# Patient Record
Sex: MALE | Race: BLACK OR AFRICAN AMERICAN
[De-identification: names, ages, dates, MRNs, and addresses within clinical notes are randomized per-mention and may not be internally consistent; named-entity substitution may affect disease eponyms.]

---

## 2019-02-11 ENCOUNTER — HOSPITAL ENCOUNTER (INPATIENT)
Dept: HOSPITAL 72 - EMR | Age: 64
LOS: 4 days | Discharge: NURSING, REHAB OR LONG-TERM CARE | DRG: 364 | End: 2019-02-15
Payer: MEDICARE

## 2019-02-11 VITALS — SYSTOLIC BLOOD PRESSURE: 98 MMHG | DIASTOLIC BLOOD PRESSURE: 71 MMHG

## 2019-02-11 VITALS — DIASTOLIC BLOOD PRESSURE: 70 MMHG | SYSTOLIC BLOOD PRESSURE: 127 MMHG

## 2019-02-11 VITALS — SYSTOLIC BLOOD PRESSURE: 95 MMHG | DIASTOLIC BLOOD PRESSURE: 63 MMHG

## 2019-02-11 VITALS — WEIGHT: 117.5 LBS | HEIGHT: 72 IN | BODY MASS INDEX: 15.92 KG/M2

## 2019-02-11 DIAGNOSIS — I10: ICD-10-CM

## 2019-02-11 DIAGNOSIS — E83.42: ICD-10-CM

## 2019-02-11 DIAGNOSIS — L02.416: Primary | ICD-10-CM

## 2019-02-11 DIAGNOSIS — J44.9: ICD-10-CM

## 2019-02-11 DIAGNOSIS — E87.6: ICD-10-CM

## 2019-02-11 DIAGNOSIS — Z88.8: ICD-10-CM

## 2019-02-11 DIAGNOSIS — Z86.718: ICD-10-CM

## 2019-02-11 DIAGNOSIS — L03.116: ICD-10-CM

## 2019-02-11 DIAGNOSIS — Z86.73: ICD-10-CM

## 2019-02-11 DIAGNOSIS — F17.200: ICD-10-CM

## 2019-02-11 DIAGNOSIS — G89.4: ICD-10-CM

## 2019-02-11 LAB
ADD MANUAL DIFF: NO
ALBUMIN SERPL-MCNC: 3.5 G/DL (ref 3.4–5)
ALBUMIN/GLOB SERPL: 0.7 {RATIO} (ref 1–2.7)
ALP SERPL-CCNC: 219 U/L (ref 46–116)
ALT SERPL-CCNC: 13 U/L (ref 12–78)
ANION GAP SERPL CALC-SCNC: 8 MMOL/L (ref 5–15)
APPEARANCE UR: CLEAR
APTT PPP: YELLOW S
AST SERPL-CCNC: 24 U/L (ref 15–37)
BASOPHILS NFR BLD AUTO: 1.1 % (ref 0–2)
BILIRUB SERPL-MCNC: 0.3 MG/DL (ref 0.2–1)
BUN SERPL-MCNC: 7 MG/DL (ref 7–18)
CALCIUM SERPL-MCNC: 9.5 MG/DL (ref 8.5–10.1)
CHLORIDE SERPL-SCNC: 100 MMOL/L (ref 98–107)
CO2 SERPL-SCNC: 31 MMOL/L (ref 21–32)
CREAT SERPL-MCNC: 0.7 MG/DL (ref 0.55–1.3)
EOSINOPHIL NFR BLD AUTO: 1.5 % (ref 0–3)
ERYTHROCYTE [DISTWIDTH] IN BLOOD BY AUTOMATED COUNT: 12.7 % (ref 11.6–14.8)
GLOBULIN SER-MCNC: 5.3 G/DL
GLUCOSE UR STRIP-MCNC: NEGATIVE MG/DL
HCT VFR BLD CALC: 39.2 % (ref 42–52)
HGB BLD-MCNC: 13.1 G/DL (ref 14.2–18)
KETONES UR QL STRIP: NEGATIVE
LEUKOCYTE ESTERASE UR QL STRIP: (no result)
LYMPHOCYTES NFR BLD AUTO: 20.6 % (ref 20–45)
MCV RBC AUTO: 94 FL (ref 80–99)
MONOCYTES NFR BLD AUTO: 7.9 % (ref 1–10)
NEUTROPHILS NFR BLD AUTO: 68.9 % (ref 45–75)
NITRITE UR QL STRIP: NEGATIVE
PH UR STRIP: 6.5 [PH] (ref 4.5–8)
PLATELET # BLD: 413 K/UL (ref 150–450)
POTASSIUM SERPL-SCNC: 4.2 MMOL/L (ref 3.5–5.1)
PROT UR QL STRIP: (no result)
RBC # BLD AUTO: 4.18 M/UL (ref 4.7–6.1)
SODIUM SERPL-SCNC: 139 MMOL/L (ref 136–145)
SP GR UR STRIP: 1.01 (ref 1–1.03)
UROBILINOGEN UR-MCNC: 1 MG/DL (ref 0–1)
WBC # BLD AUTO: 8.5 K/UL (ref 4.8–10.8)

## 2019-02-11 PROCEDURE — 87070 CULTURE OTHR SPECIMN AEROBIC: CPT

## 2019-02-11 PROCEDURE — 83735 ASSAY OF MAGNESIUM: CPT

## 2019-02-11 PROCEDURE — 82607 VITAMIN B-12: CPT

## 2019-02-11 PROCEDURE — 80048 BASIC METABOLIC PNL TOTAL CA: CPT

## 2019-02-11 PROCEDURE — 83605 ASSAY OF LACTIC ACID: CPT

## 2019-02-11 PROCEDURE — 87205 SMEAR GRAM STAIN: CPT

## 2019-02-11 PROCEDURE — 96365 THER/PROPH/DIAG IV INF INIT: CPT

## 2019-02-11 PROCEDURE — 84550 ASSAY OF BLOOD/URIC ACID: CPT

## 2019-02-11 PROCEDURE — 36415 COLL VENOUS BLD VENIPUNCTURE: CPT

## 2019-02-11 PROCEDURE — 96367 TX/PROPH/DG ADDL SEQ IV INF: CPT

## 2019-02-11 PROCEDURE — 71045 X-RAY EXAM CHEST 1 VIEW: CPT

## 2019-02-11 PROCEDURE — 81003 URINALYSIS AUTO W/O SCOPE: CPT

## 2019-02-11 PROCEDURE — 99285 EMERGENCY DEPT VISIT HI MDM: CPT

## 2019-02-11 PROCEDURE — 86140 C-REACTIVE PROTEIN: CPT

## 2019-02-11 PROCEDURE — 80061 LIPID PANEL: CPT

## 2019-02-11 PROCEDURE — 73502 X-RAY EXAM HIP UNI 2-3 VIEWS: CPT

## 2019-02-11 PROCEDURE — 80053 COMPREHEN METABOLIC PANEL: CPT

## 2019-02-11 PROCEDURE — 85651 RBC SED RATE NONAUTOMATED: CPT

## 2019-02-11 PROCEDURE — 83036 HEMOGLOBIN GLYCOSYLATED A1C: CPT

## 2019-02-11 PROCEDURE — 83880 ASSAY OF NATRIURETIC PEPTIDE: CPT

## 2019-02-11 PROCEDURE — 82977 ASSAY OF GGT: CPT

## 2019-02-11 PROCEDURE — 85025 COMPLETE CBC W/AUTO DIFF WBC: CPT

## 2019-02-11 PROCEDURE — 82746 ASSAY OF FOLIC ACID SERUM: CPT

## 2019-02-11 PROCEDURE — 84443 ASSAY THYROID STIM HORMONE: CPT

## 2019-02-11 PROCEDURE — 0J9M0ZZ DRAINAGE OF LEFT UPPER LEG SUBCUTANEOUS TISSUE AND FASCIA, OPEN APPROACH: ICD-10-PCS

## 2019-02-11 PROCEDURE — 84100 ASSAY OF PHOSPHORUS: CPT

## 2019-02-11 PROCEDURE — 96361 HYDRATE IV INFUSION ADD-ON: CPT

## 2019-02-11 PROCEDURE — 80202 ASSAY OF VANCOMYCIN: CPT

## 2019-02-11 PROCEDURE — 87040 BLOOD CULTURE FOR BACTERIA: CPT

## 2019-02-11 RX ADMIN — ENOXAPARIN SODIUM SCH MG: 30 INJECTION SUBCUTANEOUS at 22:32

## 2019-02-11 RX ADMIN — HYDROCODONE BITARTRATE AND ACETAMINOPHEN PRN TAB: 5; 325 TABLET ORAL at 22:47

## 2019-02-11 NOTE — NUR
ED Nurse Note:



Pt refused to be checked the temp. pt aao x4 and in stable condition with 
stable VS. pt was being transferred to MS unit by 1 ER tech.
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
## 2019-02-11 NOTE — DIAGNOSTIC IMAGING REPORT
Indication: Left hip pain

 

Technique: 2 views of the left hip

 

Comparison: none

 

Findings: There is superior degenerative joint space narrowing, mild. There is

extensive surgical hardware in the right hip. No definite acute fractures. No

dislocations.

 

Impression: Degenerative changes as described

 

No acute bony trauma
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
## 2019-02-11 NOTE — NUR
CASE MANAGEMENT: REVIEW



63/M BIBA FROM [facility] 



CC: SKIN RASH . ABSCESS





SI: LEFT HIP CELLULITIS

T 97.9  RR 20 BP 98/71 SAT 96% ROOM AIR

H/H 13.1/39.2 ALK PHOS 219 







IS: NS IVF BOLUS X1

UNASYN IV X1





***PATIENT ADMITTED TO MED/SURG UNIT 02/11/2019***

DCP: PATIENT IS FROM [facility]
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
## 2019-02-11 NOTE — DIAGNOSTIC IMAGING REPORT
Indication: Cough

 

Technique: One view of the chest

 

Comparison: none

 

Findings: The lungs are hyperinflated. There are multiple healed right rib fractures.

There is also an ununited but probably old right posterolateral rib fracture. The

lungs and pleural spaces are clear. The heart size is normal. Surgical clips are seen

in the left supraclavicular fossa. There is fairly extensive pleural thickening at

the left lung apex

 

Impression: No definite acute process

 

COPD changes

 

Evidence of prior right rib trauma

 

Left apical pleural scarring

 

Surgical clips in the left supraclavicular fossa

## 2019-02-12 VITALS — SYSTOLIC BLOOD PRESSURE: 112 MMHG | DIASTOLIC BLOOD PRESSURE: 79 MMHG

## 2019-02-12 VITALS — SYSTOLIC BLOOD PRESSURE: 119 MMHG | DIASTOLIC BLOOD PRESSURE: 75 MMHG

## 2019-02-12 VITALS — SYSTOLIC BLOOD PRESSURE: 112 MMHG | DIASTOLIC BLOOD PRESSURE: 82 MMHG

## 2019-02-12 VITALS — SYSTOLIC BLOOD PRESSURE: 122 MMHG | DIASTOLIC BLOOD PRESSURE: 81 MMHG

## 2019-02-12 VITALS — SYSTOLIC BLOOD PRESSURE: 104 MMHG | DIASTOLIC BLOOD PRESSURE: 69 MMHG

## 2019-02-12 LAB
ADD MANUAL DIFF: NO
ANION GAP SERPL CALC-SCNC: 12 MMOL/L (ref 5–15)
BASOPHILS NFR BLD AUTO: 1.2 % (ref 0–2)
BUN SERPL-MCNC: 5 MG/DL (ref 7–18)
CALCIUM SERPL-MCNC: 10.5 MG/DL (ref 8.5–10.1)
CHLORIDE SERPL-SCNC: 98 MMOL/L (ref 98–107)
CO2 SERPL-SCNC: 26 MMOL/L (ref 21–32)
CREAT SERPL-MCNC: 0.5 MG/DL (ref 0.55–1.3)
EOSINOPHIL NFR BLD AUTO: 2.1 % (ref 0–3)
ERYTHROCYTE [DISTWIDTH] IN BLOOD BY AUTOMATED COUNT: 13 % (ref 11.6–14.8)
HCT VFR BLD CALC: 45 % (ref 42–52)
HGB BLD-MCNC: 15.1 G/DL (ref 14.2–18)
LYMPHOCYTES NFR BLD AUTO: 24.9 % (ref 20–45)
MCV RBC AUTO: 93 FL (ref 80–99)
MONOCYTES NFR BLD AUTO: 5.1 % (ref 1–10)
NEUTROPHILS NFR BLD AUTO: 66.7 % (ref 45–75)
PLATELET # BLD: 357 K/UL (ref 150–450)
POTASSIUM SERPL-SCNC: 3.7 MMOL/L (ref 3.5–5.1)
RBC # BLD AUTO: 4.85 M/UL (ref 4.7–6.1)
SODIUM SERPL-SCNC: 136 MMOL/L (ref 136–145)
WBC # BLD AUTO: 5.9 K/UL (ref 4.8–10.8)

## 2019-02-12 RX ADMIN — DOCUSATE SODIUM SCH MG: 50 LIQUID ORAL at 09:00

## 2019-02-12 RX ADMIN — MAGNESIUM HYDROXIDE SCH ML: 400 SUSPENSION ORAL at 08:31

## 2019-02-12 RX ADMIN — ENOXAPARIN SODIUM SCH MG: 30 INJECTION SUBCUTANEOUS at 08:33

## 2019-02-12 RX ADMIN — DOCUSATE SODIUM SCH MG: 50 LIQUID ORAL at 08:31

## 2019-02-12 RX ADMIN — SODIUM CHLORIDE SCH MLS/HR: 9 INJECTION, SOLUTION INTRAVENOUS at 22:21

## 2019-02-12 RX ADMIN — ENOXAPARIN SODIUM SCH MG: 30 INJECTION SUBCUTANEOUS at 21:00

## 2019-02-12 RX ADMIN — SODIUM CHLORIDE SCH MLS/HR: 9 INJECTION, SOLUTION INTRAVENOUS at 08:32

## 2019-02-12 RX ADMIN — HYDROCODONE BITARTRATE AND ACETAMINOPHEN PRN TAB: 5; 325 TABLET ORAL at 10:17

## 2019-02-12 RX ADMIN — DOCUSATE SODIUM SCH MG: 50 LIQUID ORAL at 17:28

## 2019-02-12 RX ADMIN — Medication SCH MG: at 08:31

## 2019-02-12 RX ADMIN — HYDROCODONE BITARTRATE AND ACETAMINOPHEN PRN TAB: 5; 325 TABLET ORAL at 22:22

## 2019-02-12 NOTE — NUR
NURSE NOTES:

RT Garcia requested me to get an order for Albutrol or Duonep PRN to give it to patient 
before Mucomyst. I communicated Dr Leroy and received an order.

## 2019-02-12 NOTE — NUR
NURSE NOTES:WOUND CARE NOTES:Pt presents with resolving pressure injury to sacrum. Base of 
wound moist with pink granulation .Epithelialized borders .Non-tender when palpated.No odor 
or exudate noted.(L)0.9cm x (W)1cm. 1.1cm slit noted to L hip with small amt sanguineous 
exudate.Pt verbalized he may have scratched himself.Periwound without erythema or elevation 
in skin temp. Bilat heels are intact and blanchable .No other skin concerns noted .Pt 
educated on wound prevention .Encouraged to turn frequently or at least hourly to prevent 
further skin breakdown.



Recommendations:Apply Moisture Barrier to sacral wound .Cover with Optifoam drsg Daily and 
prn.

                         Cleanse L hip with Saline.Apply Silvasorb gel.Cover with Optifoam 
drsg every 3 days and prn.

                         Encourage and assist as needed with repositioning at least every 
2hours or as tolerated.

## 2019-02-12 NOTE — SURGERY PROGRESS NOTE
Surgery Progress Note


Subjective


Additional Comments


no acute events.  feeling better. no complaints.  wound dressings changed.





Objective





Last 24 Hour Vital Signs








  Date Time  Temp Pulse Resp B/P (MAP) Pulse Ox O2 Delivery O2 Flow Rate FiO2


 


2/12/19 12:00 98.2 81 18 112/79 (90) 95   


 


2/12/19 10:47 98.4       


 


2/12/19 09:00      Room Air  


 


2/12/19 08:00 98.4 94 18 104/69 (81) 100   


 


2/12/19 07:26      Room Air  


 


2/12/19 07:26      Room Air  


 


2/12/19 04:00 98.2 76 18 122/81 (95) 98   


 


2/12/19 00:00 98.0 88 18 112/79 (90) 95   


 


2/11/19 21:23      Room Air  


 


2/11/19 21:16      Room Air  


 


2/11/19 20:00 98.7 91 18 95/63 (74) 92   


 


2/11/19 18:10  99 21 129/70 98 Room Air  


 


2/11/19 18:10  99 20 127/70 99 Room Air  


 


2/11/19 15:30 97.9 92 20 98/71 100 Room Air  


 


2/11/19 15:00 97.9 111 20 119/74 96 Room Air  








I&O











Intake and Output  


 


 2/11/19 2/12/19





 18:59 06:59


 


Intake Total 1110 ml 275.000 ml


 


Output Total  100 ml


 


Balance 1110 ml 175.000 ml


 


  


 


Intake Oral 0 ml 


 


IV Total 1110 ml 275.000 ml


 


Output Urine Total  100 ml








Dressing:  saturated


Wound:  clean


Drains:  none


Cardiovascular:  RSR


Respiratory:  clear


Abdomen:  soft, flat, non-tender, present bowel sounds, non-distended


Extremities:  no cyanosis, other





Laboratory Tests








Test


  2/11/19


16:45 2/11/19


18:00 2/12/19


06:50


 


White Blood Count


  8.5 K/UL


(4.8-10.8) 


  5.9 K/UL


(4.8-10.8)


 


Red Blood Count


  4.18 M/UL


(4.70-6.10)  L 


  4.85 M/UL


(4.70-6.10)


 


Hemoglobin


  13.1 G/DL


(14.2-18.0)  L 


  15.1 G/DL


(14.2-18.0)


 


Hematocrit


  39.2 %


(42.0-52.0)  L 


  45.0 %


(42.0-52.0)


 


Mean Corpuscular Volume 94 FL (80-99)    93 FL (80-99)  


 


Mean Corpuscular Hemoglobin


  31.3 PG


(27.0-31.0)  H 


  31.0 PG


(27.0-31.0)


 


Mean Corpuscular Hemoglobin


Concent 33.4 G/DL


(32.0-36.0) 


  33.5 G/DL


(32.0-36.0)


 


Red Cell Distribution Width


  12.7 %


(11.6-14.8) 


  13.0 %


(11.6-14.8)


 


Platelet Count


  413 K/UL


(150-450) 


  357 K/UL


(150-450)


 


Mean Platelet Volume


  5.4 FL


(6.5-10.1)  L 


  5.8 FL


(6.5-10.1)  L


 


Neutrophils (%) (Auto)


  68.9 %


(45.0-75.0) 


  66.7 %


(45.0-75.0)


 


Lymphocytes (%) (Auto)


  20.6 %


(20.0-45.0) 


  24.9 %


(20.0-45.0)


 


Monocytes (%) (Auto)


  7.9 %


(1.0-10.0) 


  5.1 %


(1.0-10.0)


 


Eosinophils (%) (Auto)


  1.5 %


(0.0-3.0) 


  2.1 %


(0.0-3.0)


 


Basophils (%) (Auto)


  1.1 %


(0.0-2.0) 


  1.2 %


(0.0-2.0)


 


Sodium Level


  139 MMOL/L


(136-145) 


  136 MMOL/L


(136-145)


 


Potassium Level


  4.2 MMOL/L


(3.5-5.1) 


  3.7 MMOL/L


(3.5-5.1)


 


Chloride Level


  100 MMOL/L


() 


  98 MMOL/L


()


 


Carbon Dioxide Level


  31 MMOL/L


(21-32) 


  26 MMOL/L


(21-32)


 


Anion Gap


  8 mmol/L


(5-15) 


  12 mmol/L


(5-15)


 


Blood Urea Nitrogen


  7 mg/dL (7-18)


  


  5 mg/dL (7-18)


L


 


Creatinine


  0.7 MG/DL


(0.55-1.30) 


  0.5 MG/DL


(0.55-1.30)  L


 


Estimat Glomerular Filtration


Rate > 60 mL/min


(>60) 


  > 60 mL/min


(>60)


 


Glucose Level


  79 MG/DL


() 


  81 MG/DL


()


 


Lactic Acid Level


  1.70 mmol/L


(0.4-2.0) 


  


 


 


Calcium Level


  9.5 MG/DL


(8.5-10.1) 


  10.5 MG/DL


(8.5-10.1)  H


 


Total Bilirubin


  0.3 MG/DL


(0.2-1.0) 


  


 


 


Aspartate Amino Transf


(AST/SGOT) 24 U/L (15-37)


  


  


 


 


Alanine Aminotransferase


(ALT/SGPT) 13 U/L (12-78)


  


  


 


 


Alkaline Phosphatase


  219 U/L


()  H 


  


 


 


Total Protein


  8.8 G/DL


(6.4-8.2)  H 


  


 


 


Albumin


  3.5 G/DL


(3.4-5.0) 


  


 


 


Globulin 5.3 g/dL    


 


Albumin/Globulin Ratio


  0.7 (1.0-2.7)


L 


  


 


 


Urine Color  Yellow   


 


Urine Appearance  Clear   


 


Urine pH  6.5 (4.5-8.0)   


 


Urine Specific Gravity


  


  1.015


(1.005-1.035) 


 


 


Urine Protein


  


  1+ (NEGATIVE)


H 


 


 


Urine Glucose (UA)


  


  Negative


(NEGATIVE) 


 


 


Urine Ketones


  


  Negative


(NEGATIVE) 


 


 


Urine Blood


  


  Negative


(NEGATIVE) 


 


 


Urine Nitrite


  


  Negative


(NEGATIVE) 


 


 


Urine Bilirubin


  


  Negative


(NEGATIVE) 


 


 


Urine Urobilinogen


  


  1 MG/DL


(0.0-1.0)  H 


 


 


Urine Leukocyte Esterase


  


  1+ (NEGATIVE)


H 


 


 


Urine RBC


  


  0-2 /HPF (0 -


0)  H 


 


 


Urine WBC


  


  2-4 /HPF (0 -


0) 


 


 


Urine Squamous Epithelial


Cells 


  None /LPF


(NONE/OCC) 


 


 


Urine Calcium Oxalate Crystals


  


  Few /LPF


(NONE) 


 


 


Urine Bacteria


  


  Few /HPF


(NONE) 


 











Plan


Problems:  


(1) Abscess of left hip


Assessment & Plan:  63M with left hip abscess. worsening.  purulent drainage


s/i I&D





Abx as per ID


packing and dressing TID


will monitor wound clinically until healed


thank you 














Kiran Nunes Feb 12, 2019 14:18
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
## 2019-02-12 NOTE — NUR
NURSE NOTES:

Patient alert x4, on room air, no sign of distress and shortness of breath; no sign of chest 
pain. Dressing dry and intact of secral and Left-hip; IV L-hand flushes well; Incontinent 
x2. Bed at lowest position, side rails up and breaks engaged. G-tube in-place and gonna use 
it to administer medication. Call light within reach, will keep monitoring.

## 2019-02-12 NOTE — NUR
NURSE NOTES:

Pt is in bed, wake and alert. No acute distress noted. Dressing dry and intact. Vitals 
stable. Bed low in position,side rails up and call light within reach. Pt will be monitored.

## 2019-02-13 VITALS — SYSTOLIC BLOOD PRESSURE: 113 MMHG | DIASTOLIC BLOOD PRESSURE: 81 MMHG

## 2019-02-13 VITALS — DIASTOLIC BLOOD PRESSURE: 79 MMHG | SYSTOLIC BLOOD PRESSURE: 106 MMHG

## 2019-02-13 VITALS — SYSTOLIC BLOOD PRESSURE: 115 MMHG | DIASTOLIC BLOOD PRESSURE: 79 MMHG

## 2019-02-13 VITALS — SYSTOLIC BLOOD PRESSURE: 131 MMHG | DIASTOLIC BLOOD PRESSURE: 76 MMHG

## 2019-02-13 VITALS — SYSTOLIC BLOOD PRESSURE: 113 MMHG | DIASTOLIC BLOOD PRESSURE: 78 MMHG

## 2019-02-13 LAB
ADD MANUAL DIFF: NO
ANION GAP SERPL CALC-SCNC: 10 MMOL/L (ref 5–15)
BASOPHILS NFR BLD AUTO: 1.4 % (ref 0–2)
BUN SERPL-MCNC: 5 MG/DL (ref 7–18)
CALCIUM SERPL-MCNC: 9.7 MG/DL (ref 8.5–10.1)
CHLORIDE SERPL-SCNC: 101 MMOL/L (ref 98–107)
CO2 SERPL-SCNC: 28 MMOL/L (ref 21–32)
CREAT SERPL-MCNC: 0.7 MG/DL (ref 0.55–1.3)
EOSINOPHIL NFR BLD AUTO: 2.8 % (ref 0–3)
ERYTHROCYTE [DISTWIDTH] IN BLOOD BY AUTOMATED COUNT: 12.6 % (ref 11.6–14.8)
HCT VFR BLD CALC: 42.3 % (ref 42–52)
HGB BLD-MCNC: 14 G/DL (ref 14.2–18)
LYMPHOCYTES NFR BLD AUTO: 16.4 % (ref 20–45)
MCV RBC AUTO: 92 FL (ref 80–99)
MONOCYTES NFR BLD AUTO: 6.3 % (ref 1–10)
NEUTROPHILS NFR BLD AUTO: 73.1 % (ref 45–75)
PLATELET # BLD: 451 K/UL (ref 150–450)
POTASSIUM SERPL-SCNC: 3.3 MMOL/L (ref 3.5–5.1)
RBC # BLD AUTO: 4.59 M/UL (ref 4.7–6.1)
SODIUM SERPL-SCNC: 139 MMOL/L (ref 136–145)
WBC # BLD AUTO: 4.9 K/UL (ref 4.8–10.8)

## 2019-02-13 RX ADMIN — HYDROCODONE BITARTRATE AND ACETAMINOPHEN PRN TAB: 5; 325 TABLET ORAL at 17:09

## 2019-02-13 RX ADMIN — ENOXAPARIN SODIUM SCH MG: 30 INJECTION SUBCUTANEOUS at 09:04

## 2019-02-13 RX ADMIN — DOCUSATE SODIUM SCH MG: 50 LIQUID ORAL at 09:00

## 2019-02-13 RX ADMIN — MAGNESIUM HYDROXIDE SCH ML: 400 SUSPENSION ORAL at 09:03

## 2019-02-13 RX ADMIN — SODIUM CHLORIDE SCH MLS/HR: 9 INJECTION, SOLUTION INTRAVENOUS at 20:36

## 2019-02-13 RX ADMIN — HYDROCODONE BITARTRATE AND ACETAMINOPHEN PRN TAB: 5; 325 TABLET ORAL at 09:14

## 2019-02-13 RX ADMIN — ENOXAPARIN SODIUM SCH MG: 30 INJECTION SUBCUTANEOUS at 20:36

## 2019-02-13 RX ADMIN — Medication SCH MG: at 09:03

## 2019-02-13 RX ADMIN — DOCUSATE SODIUM SCH MG: 50 LIQUID ORAL at 17:07

## 2019-02-13 RX ADMIN — MAGNESIUM HYDROXIDE SCH ML: 400 SUSPENSION ORAL at 09:00

## 2019-02-13 RX ADMIN — SODIUM CHLORIDE SCH MLS/HR: 9 INJECTION, SOLUTION INTRAVENOUS at 10:30

## 2019-02-13 RX ADMIN — IPRATROPIUM BROMIDE AND ALBUTEROL SULFATE PRN ML: .5; 3 SOLUTION RESPIRATORY (INHALATION) at 20:17

## 2019-02-13 NOTE — NUR
NURSE NOTES:

Patient refused breathing treatment. Patient teaching and side effect of not getting 
treatment.

## 2019-02-13 NOTE — GENERAL PROGRESS NOTE
Assessment/Plan


Problem List:  


(1) Abscess of left hip


ICD Codes:  L02.416 - Cutaneous abscess of left lower limb


SNOMED:  889278


Status:  progressing


Assessment/Plan


s/p I&d OF hip abscess


abx per id


afebrile





Subjective


ROS Limited/Unobtainable:  Yes


Allergies:  


Coded Allergies:  


     CARBAMAZEPINE (Verified  Allergy, Unknown, 2/11/19)





Objective





Last 24 Hour Vital Signs








  Date Time  Temp Pulse Resp B/P (MAP) Pulse Ox O2 Delivery O2 Flow Rate FiO2


 


2/13/19 20:27      Room Air  


 


2/13/19 20:16      Room Air  21


 


2/13/19 20:16      Room Air  21


 


2/13/19 20:12 97.8 85 18 106/79 (88) 97   


 


2/13/19 17:39 97.5       


 


2/13/19 16:00 97.5 82 19 131/76 (94) 96   


 


2/13/19 13:28  91 16  96 Room Air  21


 


2/13/19 13:28      Room Air  21 2/13/19 09:00      Room Air  


 


2/13/19 08:10      Room Air  21


 


2/13/19 08:06  83 16  94 Room Air  21


 


2/13/19 08:00 98.0 89 18 115/79 (91) 96   


 


2/13/19 04:00 97.7 87 18 113/81 (92) 95   


 


2/13/19 01:11      Room Air  21 2/13/19 01:11      Room Air  21 2/13/19 00:00 97.4 88 18 113/78 (90) 96   

















Intake and Output  


 


 2/12/19 2/13/19





 19:00 07:00


 


Intake Total 600 ml 


 


Output Total 550 ml 


 


Balance 50 ml 


 


  


 


Intake Oral 600 ml 


 


Output Urine Total 300 ml 


 


Stool Total 250 ml 


 


# Voids 2 3


 


# Bowel Movements 4 








Laboratory Tests


2/13/19 08:05: 


White Blood Count 4.9, Red Blood Count 4.59L, Hemoglobin 14.0L, Hematocrit 42.3

, Mean Corpuscular Volume 92, Mean Corpuscular Hemoglobin 30.6, Mean 

Corpuscular Hemoglobin Concent 33.2, Red Cell Distribution Width 12.6, Platelet 

Count 451H, Mean Platelet Volume 5.8L, Neutrophils (%) (Auto) 73.1, Lymphocytes 

(%) (Auto) 16.4L, Monocytes (%) (Auto) 6.3, Eosinophils (%) (Auto) 2.8, 

Basophils (%) (Auto) 1.4, Erythrocyte Sedimentation Rate 45H, Sodium Level 139, 

Potassium Level 3.3L, Chloride Level 101, Carbon Dioxide Level 28, Anion Gap 10

, Blood Urea Nitrogen 5L, Creatinine 0.7, Estimat Glomerular Filtration Rate > 

60, Glucose Level 118H, Calcium Level 9.7, C-Reactive Protein, Quantitative 0.5

, Vancomycin Level Trough 17.0H


Height (Feet):  6


Height (Inches):  0.00


Weight (Pounds):  117


Cardiovascular:  normal rate


Respiratory/Chest:  lungs clear











Slade Leroy MD Feb 13, 2019 21:28

## 2019-02-13 NOTE — NUR
NURSE NOTES:

Patient discharged to [facility], report given to Seng. Patient stable upon 
discharge. Belongings crossed matched and signed by patient and nurse. IV acces and name tag 
removed upon discharge. 

-------------------------------------------------------------------------------

Addendum: 02/13/19 at 1358 by Neida Kirby RN

-------------------------------------------------------------------------------

Please disregard the previous NOTE. Its by mistake.
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
## 2019-02-13 NOTE — NUR
NURSE NOTES:

Patient's lab for K 3.3, I communicated Dr Rad MD order me to inform Dr Nielson. I 
called Dr Nielson office and spoke to Sue.

## 2019-02-13 NOTE — NUR
RD ASSESSMENT & RECOMMENDATIONS

SEE CARE ACTIVITY FOR COMPLETE ASSESSMENT



DAILY ESTIMATED NEEDS:

Needs based on Underweight, wound/ 56kg 

30-35  kcals/kg 

3678-8262  total kcals

1.25-1.5  g protein/kg

70-84  g total protein 

25-30  mL/kg

0313-9305  total fluid mLs



NUTRITION DIAGNOSIS:

Increased kcal/prot needs R/T wound healing, underweight status as

evidenced by admitted w/ lt hip skin abscess, s/p I&D, resolving pressure

injury to sacrum per eval, 69% IBW, low BMI per guidelines.





CURRENT DIET:REGULAR    





PO DIET RECOMMENDATIONS:

REGULAR/ texture as tolerated+ Ensure Enlive BID 







ADDITIONAL RECOMMENDATIONS:

* Calibrated bedscale wt for accurate BCW 

* Wound healing: MVI x 1 and Cj 1pkt BID 

* Weekly wt monitoring given underweight status 

* Snacks BID

## 2019-02-13 NOTE — NUR
NURSE NOTES:

Patient alert x4, on room air, no sign of distress and shortness of breath. No sign of chest 
pain,. Dressing dry and intact on secral and left hip. IV Lhand, flushes well. G-tube in 
place. Urinal and bed pan within reach. Bed at lowest position, side rails up x2, breaks 
engaged. Call light within reach. Will keep monitoring.

## 2019-02-13 NOTE — SURGERY PROGRESS NOTE
Surgery Progress Note


Subjective


Additional Comments


no acute events.  improving.  pain improved





Objective





Last 24 Hour Vital Signs








  Date Time  Temp Pulse Resp B/P (MAP) Pulse Ox O2 Delivery O2 Flow Rate FiO2


 


2/13/19 09:44 98.0       


 


2/13/19 09:00      Room Air  


 


2/13/19 08:10      Room Air  21


 


2/13/19 08:06  83 16  94 Room Air  21


 


2/13/19 08:00 98.0 89 18 115/79 (91) 96   


 


2/13/19 04:00 97.7 87 18 113/81 (92) 95   


 


2/13/19 01:11      Room Air  21


 


2/13/19 01:11      Room Air  21


 


2/13/19 00:00 97.4 88 18 113/78 (90) 96   


 


2/12/19 21:00      Room Air  


 


2/12/19 20:00      Room Air  21


 


2/12/19 20:00 98.9 88 18 112/82 (92) 97   


 


2/12/19 20:00      Room Air  21


 


2/12/19 16:00 99.0 84 18 119/75 (90) 95   


 


2/12/19 13:30      Room Air  


 


2/12/19 13:30      Room Air  








I&O











Intake and Output  


 


 2/12/19 2/13/19





 19:00 07:00


 


Intake Total 600 ml 


 


Output Total 550 ml 


 


Balance 50 ml 


 


  


 


Intake Oral 600 ml 


 


Output Urine Total 300 ml 


 


Stool Total 250 ml 


 


# Voids 2 3


 


# Bowel Movements 4 








Dressing:  saturated


Wound:  clean


Drains:  none


Cardiovascular:  RSR


Respiratory:  clear


Abdomen:  soft, flat, non-distended


Extremities:  no cyanosis





Laboratory Tests








Test


  2/13/19


08:05


 


White Blood Count


  4.9 K/UL


(4.8-10.8)


 


Red Blood Count


  4.59 M/UL


(4.70-6.10)  L


 


Hemoglobin


  14.0 G/DL


(14.2-18.0)  L


 


Hematocrit


  42.3 %


(42.0-52.0)


 


Mean Corpuscular Volume 92 FL (80-99)  


 


Mean Corpuscular Hemoglobin


  30.6 PG


(27.0-31.0)


 


Mean Corpuscular Hemoglobin


Concent 33.2 G/DL


(32.0-36.0)


 


Red Cell Distribution Width


  12.6 %


(11.6-14.8)


 


Platelet Count


  451 K/UL


(150-450)  H


 


Mean Platelet Volume


  5.8 FL


(6.5-10.1)  L


 


Neutrophils (%) (Auto)


  73.1 %


(45.0-75.0)


 


Lymphocytes (%) (Auto)


  16.4 %


(20.0-45.0)  L


 


Monocytes (%) (Auto)


  6.3 %


(1.0-10.0)


 


Eosinophils (%) (Auto)


  2.8 %


(0.0-3.0)


 


Basophils (%) (Auto)


  1.4 %


(0.0-2.0)


 


Erythrocyte Sedimentation Rate


  45 MM/HR


(0-20)  H


 


Sodium Level


  139 MMOL/L


(136-145)


 


Potassium Level


  3.3 MMOL/L


(3.5-5.1)  L


 


Chloride Level


  101 MMOL/L


()


 


Carbon Dioxide Level


  28 MMOL/L


(21-32)


 


Anion Gap


  10 mmol/L


(5-15)


 


Blood Urea Nitrogen


  5 mg/dL (7-18)


L


 


Creatinine


  0.7 MG/DL


(0.55-1.30)


 


Estimat Glomerular Filtration


Rate > 60 mL/min


(>60)


 


Glucose Level


  118 MG/DL


()  H


 


Calcium Level


  9.7 MG/DL


(8.5-10.1)


 


C-Reactive Protein,


Quantitative 0.5 mg/dL


(0.00-0.90)


 


Vancomycin Level Trough


  17.0 ug/mL


(5.0-12.0)  H











Plan


Problems:  


(1) Abscess of left hip


Assessment & Plan:  63M with left hip abscess. worsening.  purulent drainage


s/p I&D





Abx as per ID


packing and dressing TID


will monitor wound clinically until healed


okay for d/c planning.  wound healing well. 


thank you 














Kiran Nunes Feb 13, 2019 13:10
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
## 2019-02-14 VITALS — SYSTOLIC BLOOD PRESSURE: 102 MMHG | DIASTOLIC BLOOD PRESSURE: 71 MMHG

## 2019-02-14 VITALS — SYSTOLIC BLOOD PRESSURE: 116 MMHG | DIASTOLIC BLOOD PRESSURE: 72 MMHG

## 2019-02-14 VITALS — SYSTOLIC BLOOD PRESSURE: 109 MMHG | DIASTOLIC BLOOD PRESSURE: 77 MMHG

## 2019-02-14 VITALS — DIASTOLIC BLOOD PRESSURE: 69 MMHG | SYSTOLIC BLOOD PRESSURE: 122 MMHG

## 2019-02-14 VITALS — DIASTOLIC BLOOD PRESSURE: 78 MMHG | SYSTOLIC BLOOD PRESSURE: 112 MMHG

## 2019-02-14 VITALS — DIASTOLIC BLOOD PRESSURE: 82 MMHG | SYSTOLIC BLOOD PRESSURE: 118 MMHG

## 2019-02-14 LAB
ADD MANUAL DIFF: NO
ALBUMIN SERPL-MCNC: 3.3 G/DL (ref 3.4–5)
ALBUMIN/GLOB SERPL: 0.7 {RATIO} (ref 1–2.7)
ALP SERPL-CCNC: 191 U/L (ref 46–116)
ALT SERPL-CCNC: 14 U/L (ref 12–78)
ANION GAP SERPL CALC-SCNC: 9 MMOL/L (ref 5–15)
AST SERPL-CCNC: 18 U/L (ref 15–37)
BASOPHILS NFR BLD AUTO: 0.9 % (ref 0–2)
BILIRUB SERPL-MCNC: 0.3 MG/DL (ref 0.2–1)
BUN SERPL-MCNC: 6 MG/DL (ref 7–18)
CALCIUM SERPL-MCNC: 9.6 MG/DL (ref 8.5–10.1)
CHLORIDE SERPL-SCNC: 101 MMOL/L (ref 98–107)
CHOLEST SERPL-MCNC: 191 MG/DL (ref ?–200)
CO2 SERPL-SCNC: 29 MMOL/L (ref 21–32)
CREAT SERPL-MCNC: 0.6 MG/DL (ref 0.55–1.3)
EOSINOPHIL NFR BLD AUTO: 3 % (ref 0–3)
ERYTHROCYTE [DISTWIDTH] IN BLOOD BY AUTOMATED COUNT: 12.9 % (ref 11.6–14.8)
GAMMA GLUTAMYL TRANSPEPTIDASE: 15 U/L (ref 5–85)
GLOBULIN SER-MCNC: 5 G/DL
HCT VFR BLD CALC: 39.5 % (ref 42–52)
HDLC SERPL-MCNC: 48 MG/DL (ref 40–60)
HGB BLD-MCNC: 13.2 G/DL (ref 14.2–18)
LYMPHOCYTES NFR BLD AUTO: 24.4 % (ref 20–45)
MCV RBC AUTO: 92 FL (ref 80–99)
MONOCYTES NFR BLD AUTO: 7.6 % (ref 1–10)
NEUTROPHILS NFR BLD AUTO: 64.1 % (ref 45–75)
PHOSPHATE SERPL-MCNC: 3.4 MG/DL (ref 2.5–4.9)
PLATELET # BLD: 435 K/UL (ref 150–450)
POTASSIUM SERPL-SCNC: 3.9 MMOL/L (ref 3.5–5.1)
RBC # BLD AUTO: 4.28 M/UL (ref 4.7–6.1)
SODIUM SERPL-SCNC: 138 MMOL/L (ref 136–145)
TRIGL SERPL-MCNC: 72 MG/DL (ref 30–150)
WBC # BLD AUTO: 6.5 K/UL (ref 4.8–10.8)

## 2019-02-14 RX ADMIN — CEPHALEXIN SCH MG: 500 TABLET ORAL at 23:59

## 2019-02-14 RX ADMIN — ENOXAPARIN SODIUM SCH MG: 30 INJECTION SUBCUTANEOUS at 20:22

## 2019-02-14 RX ADMIN — DOCUSATE SODIUM SCH MG: 50 LIQUID ORAL at 17:47

## 2019-02-14 RX ADMIN — IPRATROPIUM BROMIDE AND ALBUTEROL SULFATE PRN ML: .5; 3 SOLUTION RESPIRATORY (INHALATION) at 01:31

## 2019-02-14 RX ADMIN — MAGNESIUM HYDROXIDE SCH ML: 400 SUSPENSION ORAL at 08:58

## 2019-02-14 RX ADMIN — HYDROCODONE BITARTRATE AND ACETAMINOPHEN PRN TAB: 5; 325 TABLET ORAL at 09:10

## 2019-02-14 RX ADMIN — Medication SCH MG: at 08:58

## 2019-02-14 RX ADMIN — ENOXAPARIN SODIUM SCH MG: 30 INJECTION SUBCUTANEOUS at 09:01

## 2019-02-14 RX ADMIN — MAGNESIUM OXIDE TAB 400 MG (241.3 MG ELEMENTAL MG) SCH MG: 400 (241.3 MG) TAB at 13:28

## 2019-02-14 RX ADMIN — SODIUM CHLORIDE SCH MLS/HR: 9 INJECTION, SOLUTION INTRAVENOUS at 09:02

## 2019-02-14 RX ADMIN — CEPHALEXIN SCH MG: 500 TABLET ORAL at 17:48

## 2019-02-14 RX ADMIN — MAGNESIUM OXIDE TAB 400 MG (241.3 MG ELEMENTAL MG) SCH MG: 400 (241.3 MG) TAB at 17:48

## 2019-02-14 RX ADMIN — DOCUSATE SODIUM SCH MG: 50 LIQUID ORAL at 09:00

## 2019-02-14 RX ADMIN — DOCUSATE SODIUM SCH MG: 50 LIQUID ORAL at 13:00

## 2019-02-14 RX ADMIN — MAGNESIUM HYDROXIDE SCH ML: 400 SUSPENSION ORAL at 09:00

## 2019-02-14 RX ADMIN — HYDROCODONE BITARTRATE AND ACETAMINOPHEN PRN TAB: 5; 325 TABLET ORAL at 20:20

## 2019-02-14 RX ADMIN — MAGNESIUM OXIDE TAB 400 MG (241.3 MG ELEMENTAL MG) SCH MG: 400 (241.3 MG) TAB at 13:00

## 2019-02-14 RX ADMIN — DOCUSATE SODIUM SCH MG: 50 LIQUID ORAL at 08:58

## 2019-02-14 NOTE — NUR
NURSE NOTES:

received report from JEAN CLAUDE [place]. patient in bed. sleeping. no respiratory distress noted.  
Gtube running. patent. bed in the lowest position. alarm on. call light within reach. will 
continue to monitor.
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
## 2019-02-14 NOTE — GENERAL PROGRESS NOTE
Assessment/Plan


Problem List:  


(1) Abscess of left hip


ICD Codes:  L02.416 - Cutaneous abscess of left lower limb


SNOMED:  563666


Status:  progressing


Assessment/Plan


s/p I&d hip abscess


abx per id


reviewed chart and labs


not confused


no fever





Subjective


ROS Limited/Unobtainable:  Yes


Allergies:  


Coded Allergies:  


     CARBAMAZEPINE (Verified  Allergy, Unknown, 2/11/19)





Objective





Last 24 Hour Vital Signs








  Date Time  Temp Pulse Resp B/P (MAP) Pulse Ox O2 Delivery O2 Flow Rate FiO2


 


2/14/19 09:00      Room Air  


 


2/14/19 08:00 98.3 92 18 112/78 (89) 98   


 


2/14/19 07:25      Room Air  21


 


2/14/19 07:25      Room Air  21


 


2/14/19 04:00 98.4 92 18 118/82 (94) 99   


 


2/14/19 01:31      Room Air  21


 


2/14/19 01:30      Room Air  21


 


2/14/19 00:00 98.0 79 18 109/77 (88) 99   


 


2/13/19 20:27      Room Air  


 


2/13/19 20:16      Room Air  21


 


2/13/19 20:16      Room Air  21


 


2/13/19 20:12 97.8 85 18 106/79 (88) 97   


 


2/13/19 17:39 97.5       


 


2/13/19 16:00 97.5 82 19 131/76 (94) 96   


 


2/13/19 13:28  91 16  96 Room Air  21


 


2/13/19 13:28      Room Air  21

















Intake and Output  


 


 2/13/19 2/14/19





 19:00 07:00


 


Intake Total 880 ml 275.000 ml


 


Output Total  1100 ml


 


Balance 880 ml -825.000 ml


 


  


 


Intake Oral 880 ml 


 


IV Total  275.000 ml


 


Output Urine Total  1100 ml


 


# Voids 6 








Laboratory Tests


2/14/19 06:10: 


White Blood Count 6.5, Red Blood Count 4.28L, Hemoglobin 13.2L, Hematocrit 39.5L

, Mean Corpuscular Volume 92, Mean Corpuscular Hemoglobin 30.9, Mean 

Corpuscular Hemoglobin Concent 33.5, Red Cell Distribution Width 12.9, Platelet 

Count 435, Mean Platelet Volume 5.9L, Neutrophils (%) (Auto) 64.1, Lymphocytes (

%) (Auto) 24.4, Monocytes (%) (Auto) 7.6, Eosinophils (%) (Auto) 3.0, Basophils 

(%) (Auto) 0.9, Sodium Level 138, Potassium Level 3.9, Chloride Level 101, 

Carbon Dioxide Level 29, Anion Gap 9, Blood Urea Nitrogen 6L, Creatinine 0.6, 

Estimat Glomerular Filtration Rate > 60, Glucose Level 96, Hemoglobin A1c 5.0, 

Uric Acid 4.4, Calcium Level 9.6, Phosphorus Level 3.4, Magnesium Level 1.6L, 

Total Bilirubin 0.3, Gamma Glutamyl Transpeptidase 15, Aspartate Amino Transf (

AST/SGOT) 18, Alanine Aminotransferase (ALT/SGPT) 14, Alkaline Phosphatase 191H

, C-Reactive Protein, Quantitative < 0.4, Pro-B-Type Natriuretic Peptide 81, 

Total Protein 8.3H, Albumin 3.3L, Globulin 5.0, Albumin/Globulin Ratio 0.7L, 

Triglycerides Level 72, Cholesterol Level 191, LDL Cholesterol 129H, HDL 

Cholesterol 48, Cholesterol/HDL Ratio 4.0, Vitamin B12 Level 384, Folate 18.9, 

Thyroid Stimulating Hormone (TSH) 0.993


Height (Feet):  6


Height (Inches):  0.00


Weight (Pounds):  117


Neck:  supple


Cardiovascular:  normal rate


Respiratory/Chest:  lungs clear


Abdomen:  soft











Slade Leroy MD Feb 14, 2019 11:05

## 2019-02-14 NOTE — SURGERY PROGRESS NOTE
Surgery Progress Note


Subjective


Symptoms:  improved, pain absent, tolerating diet, passing flatus





Objective





Last 24 Hour Vital Signs








  Date Time  Temp Pulse Resp B/P (MAP) Pulse Ox O2 Delivery O2 Flow Rate FiO2


 


2/14/19 09:00      Room Air  


 


2/14/19 08:00 98.3 92 18 112/78 (89) 98   


 


2/14/19 07:25      Room Air  21


 


2/14/19 07:25      Room Air  21


 


2/14/19 04:00 98.4 92 18 118/82 (94) 99   


 


2/14/19 01:31      Room Air  21


 


2/14/19 01:30      Room Air  21


 


2/14/19 00:00 98.0 79 18 109/77 (88) 99   


 


2/13/19 20:27      Room Air  


 


2/13/19 20:16      Room Air  21


 


2/13/19 20:16      Room Air  21


 


2/13/19 20:12 97.8 85 18 106/79 (88) 97   


 


2/13/19 17:39 97.5       


 


2/13/19 16:00 97.5 82 19 131/76 (94) 96   


 


2/13/19 13:28  91 16  96 Room Air  21


 


2/13/19 13:28      Room Air  21








I&O











Intake and Output  


 


 2/13/19 2/14/19





 19:00 07:00


 


Intake Total 880 ml 275.000 ml


 


Output Total  1100 ml


 


Balance 880 ml -825.000 ml


 


  


 


Intake Oral 880 ml 


 


IV Total  275.000 ml


 


Output Urine Total  1100 ml


 


# Voids 6 








Dressing:  saturated


Wound:  clean, dry


Drains:  none


Cardiovascular:  RSR


Respiratory:  clear


Abdomen:  soft, flat, non-tender, non-distended


Extremities:  no cyanosis





Laboratory Tests








Test


  2/14/19


06:10


 


White Blood Count


  6.5 K/UL


(4.8-10.8)


 


Red Blood Count


  4.28 M/UL


(4.70-6.10)  L


 


Hemoglobin


  13.2 G/DL


(14.2-18.0)  L


 


Hematocrit


  39.5 %


(42.0-52.0)  L


 


Mean Corpuscular Volume 92 FL (80-99)  


 


Mean Corpuscular Hemoglobin


  30.9 PG


(27.0-31.0)


 


Mean Corpuscular Hemoglobin


Concent 33.5 G/DL


(32.0-36.0)


 


Red Cell Distribution Width


  12.9 %


(11.6-14.8)


 


Platelet Count


  435 K/UL


(150-450)


 


Mean Platelet Volume


  5.9 FL


(6.5-10.1)  L


 


Neutrophils (%) (Auto)


  64.1 %


(45.0-75.0)


 


Lymphocytes (%) (Auto)


  24.4 %


(20.0-45.0)


 


Monocytes (%) (Auto)


  7.6 %


(1.0-10.0)


 


Eosinophils (%) (Auto)


  3.0 %


(0.0-3.0)


 


Basophils (%) (Auto)


  0.9 %


(0.0-2.0)


 


Sodium Level


  138 MMOL/L


(136-145)


 


Potassium Level


  3.9 MMOL/L


(3.5-5.1)


 


Chloride Level


  101 MMOL/L


()


 


Carbon Dioxide Level


  29 MMOL/L


(21-32)


 


Anion Gap


  9 mmol/L


(5-15)


 


Blood Urea Nitrogen


  6 mg/dL (7-18)


L


 


Creatinine


  0.6 MG/DL


(0.55-1.30)


 


Estimat Glomerular Filtration


Rate > 60 mL/min


(>60)


 


Glucose Level


  96 MG/DL


()


 


Hemoglobin A1c


  5.0 %


(4.3-6.0)


 


Uric Acid


  4.4 MG/DL


(2.6-7.2)


 


Calcium Level


  9.6 MG/DL


(8.5-10.1)


 


Phosphorus Level


  3.4 MG/DL


(2.5-4.9)


 


Magnesium Level


  1.6 MG/DL


(1.8-2.4)  L


 


Total Bilirubin


  0.3 MG/DL


(0.2-1.0)


 


Gamma Glutamyl Transpeptidase 15 U/L (5-85)  


 


Aspartate Amino Transf


(AST/SGOT) 18 U/L (15-37)


 


 


Alanine Aminotransferase


(ALT/SGPT) 14 U/L (12-78)


 


 


Alkaline Phosphatase


  191 U/L


()  H


 


C-Reactive Protein,


Quantitative < 0.4 mg/dL


(0.00-0.90)


 


Pro-B-Type Natriuretic Peptide


  81 pg/mL


(0-125)


 


Total Protein


  8.3 G/DL


(6.4-8.2)  H


 


Albumin


  3.3 G/DL


(3.4-5.0)  L


 


Globulin 5.0 g/dL  


 


Albumin/Globulin Ratio


  0.7 (1.0-2.7)


L


 


Triglycerides Level


  72 MG/DL


()


 


Cholesterol Level


  191 MG/DL (<


200)


 


LDL Cholesterol


  129 mg/dL


(<100)  H


 


HDL Cholesterol


  48 MG/DL


(40-60)


 


Cholesterol/HDL Ratio 4.0 (3.3-4.4)  


 


Vitamin B12 Level


  384 PG/ML


(193-986)


 


Folate


  18.9 NG/ML


(8.6-58.9)


 


Thyroid Stimulating Hormone


(TSH) 0.993 uiU/mL


(0.358-3.740)











Plan


Problems:  


(1) Abscess of left hip


Assessment & Plan:  63M with left hip abscess. worsening.  purulent drainage


s/p I&D





Abx as per ID


packing and dressing TID


will monitor wound clinically until healed


okay for d/c planning.  wound healing well. 


thank you 














Kiran Nunes Feb 14, 2019 12:35
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
## 2019-02-15 VITALS — DIASTOLIC BLOOD PRESSURE: 100 MMHG | SYSTOLIC BLOOD PRESSURE: 124 MMHG

## 2019-02-15 VITALS — SYSTOLIC BLOOD PRESSURE: 120 MMHG | DIASTOLIC BLOOD PRESSURE: 80 MMHG

## 2019-02-15 VITALS — DIASTOLIC BLOOD PRESSURE: 77 MMHG | SYSTOLIC BLOOD PRESSURE: 128 MMHG

## 2019-02-15 VITALS — DIASTOLIC BLOOD PRESSURE: 87 MMHG | SYSTOLIC BLOOD PRESSURE: 126 MMHG

## 2019-02-15 RX ADMIN — HYDROCODONE BITARTRATE AND ACETAMINOPHEN PRN TAB: 5; 325 TABLET ORAL at 09:29

## 2019-02-15 RX ADMIN — CEPHALEXIN SCH MG: 500 TABLET ORAL at 12:41

## 2019-02-15 RX ADMIN — ENOXAPARIN SODIUM SCH MG: 30 INJECTION SUBCUTANEOUS at 09:24

## 2019-02-15 RX ADMIN — DOCUSATE SODIUM SCH MG: 50 LIQUID ORAL at 12:40

## 2019-02-15 RX ADMIN — DOCUSATE SODIUM SCH MG: 50 LIQUID ORAL at 09:00

## 2019-02-15 RX ADMIN — MAGNESIUM HYDROXIDE SCH ML: 400 SUSPENSION ORAL at 09:00

## 2019-02-15 RX ADMIN — Medication SCH MG: at 09:21

## 2019-02-15 RX ADMIN — MAGNESIUM OXIDE TAB 400 MG (241.3 MG ELEMENTAL MG) SCH MG: 400 (241.3 MG) TAB at 12:41

## 2019-02-15 RX ADMIN — CEPHALEXIN SCH MG: 500 TABLET ORAL at 05:38

## 2019-02-15 RX ADMIN — MAGNESIUM OXIDE TAB 400 MG (241.3 MG ELEMENTAL MG) SCH MG: 400 (241.3 MG) TAB at 09:21

## 2019-02-15 NOTE — GENERAL PROGRESS NOTE
Assessment/Plan


Problem List:  


(1) Abscess of left hip


ICD Codes:  L02.416 - Cutaneous abscess of left lower limb


SNOMED:  981204


Status:  progressing


Assessment/Plan


dc  back to snf


see dc summary for details


s/p I&d hip abscess


abx per id


reviewed chart and labs


not confused


no fever





Subjective


ROS Limited/Unobtainable:  Yes


Allergies:  


Coded Allergies:  


     CARBAMAZEPINE (Verified  Allergy, Unknown, 2/11/19)





Objective





Last 24 Hour Vital Signs








  Date Time  Temp Pulse Resp B/P (MAP) Pulse Ox O2 Delivery O2 Flow Rate FiO2


 


2/15/19 09:59 98.3       


 


2/15/19 09:48      Room Air  


 


2/15/19 08:00 98.1 99 20 120/80 (93) 97   


 


2/15/19 06:57      Room Air  21


 


2/15/19 06:57      Room Air  21


 


2/15/19 04:00 98.3 92 20 124/100 (108) 96   


 


2/15/19 01:07      Room Air  21


 


2/15/19 01:06      Room Air  21


 


2/15/19 00:17 98.4 90 20 126/87 (100) 99   


 


2/14/19 20:05      Room Air  


 


2/14/19 20:00      Room Air  21


 


2/14/19 20:00      Room Air  21


 


2/14/19 20:00 98.4 89 20 122/69 (86) 100   


 


2/14/19 16:00 98.6 80 18 102/71 (81) 99   


 


2/14/19 13:19      Room Air  21


 


2/14/19 13:19      Room Air  21

















Intake and Output  


 


 2/14/19 2/15/19





 18:59 06:59


 


Intake Total 800 ml 


 


Balance 800 ml 


 


  


 


Intake Oral 800 ml 


 


# Voids 5 








Height (Feet):  6


Height (Inches):  0.00


Weight (Pounds):  117











Slade Leroy MD Feb 15, 2019 12:18

## 2019-02-15 NOTE — NUR
NURSE NOTES:

pt discharged to [facility] picked up by ambulance with stable condition. All discharge 
report given to the nurse Shakira and verbalized understanding. Iv heplock removed. pt refused 
to take picture of sacral area. all belongings given to pt. VSS. denies any pain at this 
time.

## 2019-02-15 NOTE — NUR
*-* DISCHARGE PLANNING *-*



PATIENT HAS BEEN REFERRED BACK TO:



[facility]

P:[phone]

F: [phone]

## 2019-02-15 NOTE — SURGERY PROGRESS NOTE
Surgery Progress Note


Subjective


Symptoms:  improved, pain absent, tolerating diet, BM





Objective





Last 24 Hour Vital Signs








  Date Time  Temp Pulse Resp B/P (MAP) Pulse Ox O2 Delivery O2 Flow Rate FiO2


 


2/15/19 12:26      Room Air  21


 


2/15/19 12:26      Room Air  21


 


2/15/19 12:00 98.2 95 19 128/77 (94) 97   


 


2/15/19 09:59 98.3       


 


2/15/19 09:48      Room Air  


 


2/15/19 08:00 98.1 99 20 120/80 (93) 97   


 


2/15/19 06:57      Room Air  21


 


2/15/19 06:57      Room Air  21


 


2/15/19 04:00 98.3 92 20 124/100 (108) 96   


 


2/15/19 01:07      Room Air  21


 


2/15/19 01:06      Room Air  21


 


2/15/19 00:17 98.4 90 20 126/87 (100) 99   


 


2/14/19 20:05      Room Air  


 


2/14/19 20:00      Room Air  21 2/14/19 20:00      Room Air  21 2/14/19 20:00 98.4 89 20 122/69 (86) 100   


 


2/14/19 16:00 98.6 80 18 102/71 (81) 99   








I&O











Intake and Output  


 


 2/14/19 2/15/19





 19:00 07:00


 


Intake Total 800 ml 


 


Balance 800 ml 


 


  


 


Intake Oral 800 ml 


 


# Voids 5 








Dressing:  dry


Wound:  clean


Drains:  none


Cardiovascular:  RSR


Respiratory:  clear


Abdomen:  soft, non-tender, non-distended


Extremities:  no cyanosis





Plan


Problems:  


(1) Abscess of left hip


Assessment & Plan:  63M with left hip abscess. worsening.  purulent drainage


s/p I&D





Abx as per ID


packing and dressing TID


will monitor wound clinically until healed


okay for d/c planning.  wound healing well. 


thank you 














Kiran Nunes Feb 15, 2019 14:27

## 2019-02-15 NOTE — NUR
NURSE NOTES:

pt in bed with no sob nor in any form of distress noted. all due meds given as ordered. pain 
med given as needed. Flushed GT and patent. will continue to monitor

## 2019-02-15 NOTE — NUR
*-* DISCHARGE PLANNED *-*



[facility]



[facility]

ROOM[facility] [address]

[facility]

T:[phone] FOR NURSE TO NURSE REPORT

[facility] HAS BEEN ARRANGED FOR  AT 1330 S/W [facility]

## 2019-02-17 NOTE — NUR
CASE MANAGEMENT:



CM review and clinical information (face sheet/ ER MD Note/ H&P) faxed to [facility] @ 
[phone] and [facility] @ [phone]
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
